# Patient Record
Sex: FEMALE | Race: WHITE | Employment: FULL TIME | URBAN - METROPOLITAN AREA
[De-identification: names, ages, dates, MRNs, and addresses within clinical notes are randomized per-mention and may not be internally consistent; named-entity substitution may affect disease eponyms.]

---

## 2017-11-28 ENCOUNTER — HOSPITAL ENCOUNTER (OUTPATIENT)
Dept: MAMMOGRAPHY | Age: 42
Discharge: HOME | End: 2017-11-28
Attending: OBSTETRICS & GYNECOLOGY
Payer: COMMERCIAL

## 2017-11-28 DIAGNOSIS — Z12.31 ENCOUNTER FOR MAMMOGRAM TO ESTABLISH BASELINE MAMMOGRAM: ICD-10-CM

## 2017-11-28 PROCEDURE — 77067 SCR MAMMO BI INCL CAD: CPT

## 2018-10-29 PROBLEM — T70.29XA: Status: ACTIVE | Noted: 2018-10-29

## 2018-10-29 PROBLEM — R06.02 SHORTNESS OF BREATH: Status: ACTIVE | Noted: 2018-10-29

## 2018-12-05 ENCOUNTER — HOSPITAL ENCOUNTER (OUTPATIENT)
Dept: MAMMOGRAPHY | Age: 43
Discharge: HOME | End: 2018-12-05
Attending: OBSTETRICS & GYNECOLOGY
Payer: COMMERCIAL

## 2018-12-05 DIAGNOSIS — Z12.31 VISIT FOR SCREENING MAMMOGRAM: ICD-10-CM

## 2018-12-05 PROCEDURE — 77067 SCR MAMMO BI INCL CAD: CPT

## 2020-04-28 PROBLEM — B34.9 VIRAL ILLNESS: Status: ACTIVE | Noted: 2020-04-28

## 2020-04-28 PROBLEM — R07.89 ANTERIOR CHEST WALL PAIN: Status: ACTIVE | Noted: 2020-04-28

## 2020-04-28 PROBLEM — R05.9 COUGH: Status: ACTIVE | Noted: 2020-04-28

## 2020-04-28 PROBLEM — J30.1 SEASONAL ALLERGIC RHINITIS DUE TO POLLEN: Status: ACTIVE | Noted: 2020-04-28

## 2020-06-22 ENCOUNTER — HOSPITAL ENCOUNTER (OUTPATIENT)
Dept: MAMMOGRAPHY | Age: 45
Discharge: HOME | End: 2020-06-22
Attending: OBSTETRICS & GYNECOLOGY
Payer: COMMERCIAL

## 2020-06-22 DIAGNOSIS — Z12.31 OTHER SCREENING MAMMOGRAM: ICD-10-CM

## 2020-06-22 PROCEDURE — 77067 SCR MAMMO BI INCL CAD: CPT

## 2021-05-24 ENCOUNTER — TRANSCRIBE ORDER (OUTPATIENT)
Dept: SCHEDULING | Age: 46
End: 2021-05-24

## 2021-05-24 DIAGNOSIS — Z12.31 VISIT FOR SCREENING MAMMOGRAM: Primary | ICD-10-CM

## 2021-06-25 ENCOUNTER — HOSPITAL ENCOUNTER (OUTPATIENT)
Dept: MAMMOGRAPHY | Age: 46
Discharge: HOME | End: 2021-06-25
Attending: OBSTETRICS & GYNECOLOGY

## 2021-06-25 DIAGNOSIS — Z12.31 VISIT FOR SCREENING MAMMOGRAM: ICD-10-CM

## 2022-01-14 PROBLEM — Z00.00 ROUTINE PHYSICAL EXAMINATION: Status: ACTIVE | Noted: 2022-01-14

## 2022-01-14 PROBLEM — E78.00 PURE HYPERCHOLESTEROLEMIA: Status: ACTIVE | Noted: 2022-01-14

## 2022-01-14 PROBLEM — T14.8XXA SKIN EXCORIATION: Status: ACTIVE | Noted: 2022-01-14

## 2022-02-08 ENCOUNTER — HOSPITAL ENCOUNTER (OUTPATIENT)
Dept: GENERAL RADIOLOGY | Age: 47
Discharge: HOME | End: 2022-02-08
Payer: COMMERCIAL

## 2022-02-08 DIAGNOSIS — R07.89 CHEST DISCOMFORT: ICD-10-CM

## 2022-02-08 DIAGNOSIS — R06.00 DYSPNEA, UNSPECIFIED TYPE: ICD-10-CM

## 2022-02-08 PROCEDURE — 71046 X-RAY EXAM CHEST 2 VIEWS: CPT

## 2022-02-08 NOTE — PROGRESS NOTES
Pt notified of normal cxr per Dr. Rosalia Johns. Pt verbalized understanding. Pt said sx's haven't improved. Offered appt per Dr. Rosalia Johns and pt would like to hold off on that for now. Advised pt if no improvement to call office back for appt. Pt agreed.

## 2022-02-13 PROBLEM — Z00.00 ROUTINE PHYSICAL EXAMINATION: Status: RESOLVED | Noted: 2022-01-14 | Resolved: 2022-02-13

## 2022-03-18 PROBLEM — B34.9 VIRAL ILLNESS: Status: ACTIVE | Noted: 2020-04-28

## 2022-03-19 PROBLEM — J30.1 SEASONAL ALLERGIC RHINITIS DUE TO POLLEN: Status: ACTIVE | Noted: 2020-04-28

## 2022-03-19 PROBLEM — T14.8XXA SKIN EXCORIATION: Status: ACTIVE | Noted: 2022-01-14

## 2022-03-19 PROBLEM — R07.89 CHEST DISCOMFORT: Status: ACTIVE | Noted: 2020-04-28

## 2022-03-19 PROBLEM — T70.29XA: Status: ACTIVE | Noted: 2018-10-29

## 2022-03-19 PROBLEM — E78.00 PURE HYPERCHOLESTEROLEMIA: Status: ACTIVE | Noted: 2022-01-14

## 2022-03-19 PROBLEM — R05.9 COUGH: Status: ACTIVE | Noted: 2020-04-28

## 2022-06-29 ENCOUNTER — HOSPITAL ENCOUNTER (OUTPATIENT)
Dept: MAMMOGRAPHY | Age: 47
Discharge: HOME | End: 2022-07-02

## 2022-06-29 DIAGNOSIS — Z12.31 ENCOUNTER FOR SCREENING MAMMOGRAM FOR MALIGNANT NEOPLASM OF BREAST: ICD-10-CM

## 2022-12-01 ENCOUNTER — OFFICE VISIT (OUTPATIENT)
Dept: OBGYN CLINIC | Age: 47
End: 2022-12-01
Payer: COMMERCIAL

## 2022-12-01 VITALS
BODY MASS INDEX: 28.91 KG/M2 | WEIGHT: 184.2 LBS | HEIGHT: 67 IN | DIASTOLIC BLOOD PRESSURE: 78 MMHG | SYSTOLIC BLOOD PRESSURE: 126 MMHG

## 2022-12-01 DIAGNOSIS — Z13.89 SCREENING FOR GENITOURINARY CONDITION: ICD-10-CM

## 2022-12-01 DIAGNOSIS — Z80.9 FAMILY HISTORY OF CANCER: ICD-10-CM

## 2022-12-01 DIAGNOSIS — Z01.419 WELL WOMAN EXAM: Primary | ICD-10-CM

## 2022-12-01 DIAGNOSIS — Z11.51 SCREENING FOR HUMAN PAPILLOMAVIRUS (HPV): ICD-10-CM

## 2022-12-01 DIAGNOSIS — Z12.4 PAP SMEAR FOR CERVICAL CANCER SCREENING: ICD-10-CM

## 2022-12-01 LAB
BILIRUBIN, URINE, POC: NEGATIVE
BLOOD URINE, POC: NEGATIVE
GLUCOSE URINE, POC: NEGATIVE
KETONES, URINE, POC: NEGATIVE
LEUKOCYTE ESTERASE, URINE, POC: NEGATIVE
NITRITE, URINE, POC: NEGATIVE
PH, URINE, POC: 5.5 (ref 4.6–8)
PROTEIN,URINE, POC: NEGATIVE
SPECIFIC GRAVITY, URINE, POC: 1.03 (ref 1–1.03)
URINALYSIS CLARITY, POC: CLEAR
URINALYSIS COLOR, POC: YELLOW
UROBILINOGEN, POC: NORMAL

## 2022-12-01 PROCEDURE — G8484 FLU IMMUNIZE NO ADMIN: HCPCS | Performed by: OBSTETRICS & GYNECOLOGY

## 2022-12-01 PROCEDURE — 99386 PREV VISIT NEW AGE 40-64: CPT | Performed by: OBSTETRICS & GYNECOLOGY

## 2022-12-01 PROCEDURE — 81002 URINALYSIS NONAUTO W/O SCOPE: CPT | Performed by: OBSTETRICS & GYNECOLOGY

## 2022-12-01 RX ORDER — MISOPROSTOL 200 UG/1
TABLET ORAL
Qty: 2 TABLET | Refills: 0 | Status: SHIPPED | OUTPATIENT
Start: 2022-12-01

## 2022-12-01 NOTE — PROGRESS NOTES
HPI: Ms. Claire Sanchez is a 49 y.o.   OB History          0    Para   0    Term   0       0    AB   0    Living   0         SAB   0    IAB   0    Ectopic   0    Molar   0    Multiple   0    Live Births   0             who is here today for a well woman exam. Periods getting heavy - now very irregular-   Changes pad every hour when heavy  PT very tearful likely afraid of bleeding with family history    Needs colonoscopy   Date Performed Result   PAP 19 Negative HR HPV Negative   Mammogram 22 BD1-Negative   Colonoscopy NA    Dexa NA      GYN History           Patient's last menstrual period was 2022 (approximate). Since April menstrual cycles have been irregular, unpredictable, and heavy. Admits to some pelvic discomfort and bloating on the right side of her abdomen. Past Medical History:  History reviewed. No pertinent past medical history. Past Surgical History:  No past surgical history on file. Allergies:   No Known Allergies    Medication History:  No current outpatient medications on file. No current facility-administered medications for this visit. Social History:  Social History     Socioeconomic History    Marital status: Single     Spouse name: Not on file    Number of children: Not on file    Years of education: Not on file    Highest education level: Not on file   Occupational History    Not on file   Tobacco Use    Smoking status: Never    Smokeless tobacco: Never   Vaping Use    Vaping Use: Never used   Substance and Sexual Activity    Alcohol use:  Yes     Alcohol/week: 7.0 standard drinks    Drug use: No    Sexual activity: Not Currently     Birth control/protection: Surgical   Other Topics Concern    Not on file   Social History Narrative    Works at [employer],  Lives by herself, came from [place] for her job     Social Determinants of Health     Financial Resource Strain: Not on [employer] Insecurity: Not on file   Transportation Needs: Not on file Physical Activity: Not on file   Stress: Not on file   Social Connections: Not on file   Intimate Partner Violence: Not on file   Housing Stability: Not on file       Family History:  Family History   Problem Relation Age of Onset    Bleeding Prob Sister     Breast Cancer Maternal Grandmother     Cancer Mother 48        stage 4 endometrial cnacer    Other Father         non hod lymph, blood clots       Review of Systems - General ROS: negative except for that discussed in HPI      ROS:  Feeling well. No dyspnea or chest pain on exertion. No abdominal pain, change in bowel habits, black or bloody stools. No urinary tract symptoms. No neurological complaints. Objective:   Ht 5' 7\" (1.702 m)   Wt 184 lb 3.2 oz (83.6 kg)   LMP 11/25/2022 (Approximate)   BMI 28.85 kg/m²     No results found for any visits on 12/01/22. The patient appears well, alert, oriented x 3, in no distress. ENT normal.  Neck supple. No adenopathy or thyromegaly. Lungs:  clear, good air entry, no wheezes, rhonchi or rales. Heart:  S1 and S2 normal, no murmurs, regular rate and rhythm. Abdomen:  soft without tenderness, guarding, mass or organomegaly. Extremities show no edema, normal peripheral pulses. Neurological is normal, no focal findings. BREAST EXAM: breasts appear normal, no suspicious masses, no skin or nipple changes or axillary nodes    PELVIC EXAM: External genitalia is within normal limits, urethra, urethra meatus and bladder are midline well supported. Vagina is well rugated, Cervix comes into full view and is within normal limits. Uterus is 6ante,  week size, no ovarian masses palpated    Assessment/Plan:      Diagnosis Orders   1. Well woman exam  AMB POC URINALYSIS DIP STICK MANUAL W/O MICRO    PAP IG, Aptima HPV and rfx 16/18,45 ([id number])    PAP IG, Aptima HPV and rfx 16/18,45 ([id number])      2. Screening for genitourinary condition  AMB POC URINALYSIS DIP STICK MANUAL W/O MICRO      3.  Pap smear for cervical cancer screening  PAP IG, Aptima HPV and rfx 16/18,45 ([id number])    PAP IG, Aptima HPV and rfx 16/18,45 ([id number])      4. Screening for human papillomavirus (HPV)  PAP IG, Aptima HPV and rfx 16/18,45 ([id number])    PAP IG, Aptima HPV and rfx 16/18,45 ([id number])      5. Family history of cancer          Encounter Diagnoses   Name Primary? Well woman exam Yes    Screening for genitourinary condition     Pap smear for cervical cancer screening     Screening for human papillomavirus (HPV)     Family history of cancer      Orders Placed This Encounter   Procedures    PAP IG, Aptima HPV and rfx 16/18,45 ([id number])     Standing Status:   Future     Number of Occurrences:   1     Standing Expiration Date:   12/1/2023     Order Specific Question:   Pap Source? (Required)     Answer:   cervical     Order Specific Question:   Pap Source? (Required)     Answer:   endocervical     Order Specific Question:   Pap Source? (Required)     Answer:   endometrial     Order Specific Question:   Pap collection method? (Required     Answer:   broom     Order Specific Question:   Pap collection method? (Required     Answer:   brush     Order Specific Question:   Menstrual Status ? Answer:   Having periods [5]     Order Specific Question:   Date of LMP? (Required)     Answer:   12/1/2022     Order Specific Question:   Previous Treatment?           (Required)     Answer:   NONE    AMB POC URINALYSIS DIP STICK MANUAL W/O MICRO       mammogram  pap smear  return annually or prn  Colonoscopy  Need us to eval lining and emb sec to bleeding and fh  cytotoec

## 2022-12-12 NOTE — PROGRESS NOTES
ENDOMETRIAL BIOPSY    Date:  1975    Name:  Ernestina Church     :  1975    Age:  49 y.o.    OB History          0    Para   0    Term   0       0    AB   0    Living   0         SAB   0    IAB   0    Ectopic   0    Molar   0    Multiple   0    Live Births   0                LMP 2022 (Approximate)     Previous Ultrasound findings showed a uterus measuring 8.08 x 3.84 x 4.25 cm, stripe 8.7 mm, right ovary is visualized with follicles and possible hypoechoic mass noted possible complex cyst vs solid mass with no flow. Left ovary is visualized with follicles and probable dominant follicle noted. Risks, benefits and alternatives of procedure described to patient in detail. Pt voices full understanding and wishes to proceed. Reason for procedure:  irregular bleeding. Mother with history of endometrial cancer (age 48) (). Maternal mother history of breast cancer. Speculum was inserted. Cervix visualized and cleansed with betadine. Single-tooth tenaculum was not applied to anterior lip. Curette was inserted through OS. Uterus sounded to 8 cm. Tissue obtained was moderate in amount. Patient tolerated procedure well. Greater than 50% of 15 minutes spent on counseling regarding procedure, follow up and future treatment.   Will see in 6 weeks for recheck of solid right ovarian cyst    Ami Hartman MD

## 2022-12-13 ENCOUNTER — OFFICE VISIT (OUTPATIENT)
Dept: OBGYN CLINIC | Age: 47
End: 2022-12-13
Payer: COMMERCIAL

## 2022-12-13 VITALS — WEIGHT: 185.2 LBS | BODY MASS INDEX: 29.07 KG/M2 | HEIGHT: 67 IN

## 2022-12-13 DIAGNOSIS — R10.2 PELVIC PAIN: ICD-10-CM

## 2022-12-13 DIAGNOSIS — R14.0 ABDOMINAL BLOATING: ICD-10-CM

## 2022-12-13 DIAGNOSIS — N92.6 IRREGULAR BLEEDING: Primary | ICD-10-CM

## 2022-12-13 DIAGNOSIS — Z01.812 PRE-PROCEDURAL LABORATORY EXAMINATION: ICD-10-CM

## 2022-12-13 PROCEDURE — 76830 TRANSVAGINAL US NON-OB: CPT | Performed by: OBSTETRICS & GYNECOLOGY

## 2022-12-13 PROCEDURE — 99213 OFFICE O/P EST LOW 20 MIN: CPT | Performed by: OBSTETRICS & GYNECOLOGY

## 2022-12-13 PROCEDURE — 58100 BIOPSY OF UTERUS LINING: CPT | Performed by: OBSTETRICS & GYNECOLOGY

## 2022-12-13 PROCEDURE — G8419 CALC BMI OUT NRM PARAM NOF/U: HCPCS | Performed by: OBSTETRICS & GYNECOLOGY

## 2022-12-13 PROCEDURE — G8428 CUR MEDS NOT DOCUMENT: HCPCS | Performed by: OBSTETRICS & GYNECOLOGY

## 2022-12-13 PROCEDURE — 1036F TOBACCO NON-USER: CPT | Performed by: OBSTETRICS & GYNECOLOGY

## 2022-12-13 PROCEDURE — G8484 FLU IMMUNIZE NO ADMIN: HCPCS | Performed by: OBSTETRICS & GYNECOLOGY

## 2022-12-15 ENCOUNTER — TELEPHONE (OUTPATIENT)
Dept: OBGYN CLINIC | Age: 47
End: 2022-12-15

## 2023-01-23 NOTE — PROGRESS NOTES
The patient is a 49 y.o. Fernandez Soria who is seen for follow up on solid right ovarian cyst. Seen 2022 for endometrial biopsy. Pathology 2022:   \"ENDOMETRIAL BIOPSY\":  BENIGN ENDOMETRIAL TISSUE. U/S findings from today 2023:    U/S findings from 2022:  Uterine volume 68.97      HISTORY:      Patient's last menstrual period was 2022 (approximate). Sexual History:  {Sexual Hx:5163}  Contraception:  {Contraception Methods:5051}  Current Outpatient Medications on File Prior to Visit   Medication Sig Dispense Refill    miSOPROStol (CYTOTEC) 200 MCG tablet Insert one tablet per vagina and take one tablet by oral route night prior to endometrial biopsy (Patient not taking: Reported on 2022) 2 tablet 0     No current facility-administered medications on file prior to visit. ROS:  Feeling well. No dyspnea or chest pain on exertion. No abdominal pain, change in bowel habits, black or bloody stools. No urinary tract symptoms. GYN ROS: {gyn ros:153915}. PHYSICAL EXAM:  Last menstrual period 2022, not currently breastfeeding. The patient appears well, alert, oriented x 3, in no distress. Lungs are clear. Heart RRR, no murmurs. Abdomen soft without tenderness, guarding, mass or organomegaly. Pelvic: {pelvic exam:851893::\"normal external genitalia, vulva, vagina, cervix, uterus and adnexa\"}. ASSESSMENT:    {There are no diagnoses linked to this encounter.  (Refresh or delete this SmartLink)}     PLAN:  {Gyn plan:77242}          Jes Stein RN

## 2023-01-25 ENCOUNTER — OFFICE VISIT (OUTPATIENT)
Dept: OBGYN CLINIC | Age: 48
End: 2023-01-25
Payer: COMMERCIAL

## 2023-01-25 VITALS
DIASTOLIC BLOOD PRESSURE: 74 MMHG | WEIGHT: 183.6 LBS | SYSTOLIC BLOOD PRESSURE: 124 MMHG | BODY MASS INDEX: 28.82 KG/M2 | HEIGHT: 67 IN

## 2023-01-25 DIAGNOSIS — N83.201 RIGHT OVARIAN CYST: Primary | ICD-10-CM

## 2023-01-25 DIAGNOSIS — Z80.9 FAMILY HISTORY OF CANCER: ICD-10-CM

## 2023-01-25 DIAGNOSIS — N92.6 IRREGULAR BLEEDING: ICD-10-CM

## 2023-01-25 DIAGNOSIS — R14.0 ABDOMINAL BLOATING: ICD-10-CM

## 2023-01-25 DIAGNOSIS — Z13.89 SCREENING FOR GENITOURINARY CONDITION: ICD-10-CM

## 2023-01-25 PROCEDURE — 99213 OFFICE O/P EST LOW 20 MIN: CPT | Performed by: OBSTETRICS & GYNECOLOGY

## 2023-01-25 PROCEDURE — 76830 TRANSVAGINAL US NON-OB: CPT | Performed by: OBSTETRICS & GYNECOLOGY

## 2023-01-25 PROCEDURE — G8484 FLU IMMUNIZE NO ADMIN: HCPCS | Performed by: OBSTETRICS & GYNECOLOGY

## 2023-01-25 PROCEDURE — G8419 CALC BMI OUT NRM PARAM NOF/U: HCPCS | Performed by: OBSTETRICS & GYNECOLOGY

## 2023-01-25 PROCEDURE — G8427 DOCREV CUR MEDS BY ELIG CLIN: HCPCS | Performed by: OBSTETRICS & GYNECOLOGY

## 2023-01-25 PROCEDURE — 1036F TOBACCO NON-USER: CPT | Performed by: OBSTETRICS & GYNECOLOGY

## 2023-01-25 NOTE — PROGRESS NOTES
The patient is a 49 y.o. Alisha Andrews who is seen for follow up on solid right ovarian cyst. Seen 2022 for endometrial biopsy. No bleeding since biopsy  Biopsy results benign  U/S findings from today 2023:  GYN US performed to follow up a ? Rt ov cyst  CX appears closed with numerous simple nabothian cyst all <1 cm  CX appears Retroverted with anteflexed uterus  Sliver of fluid noted in cervical canal  Heterogenous uterus   Endo=2.8 mm no intracavitary masses visualized   There is a new cystic area just posterior and left of endometrium = 7 x 7 x 4mm  Previously noted tiny fibroid not appreciated in today's exam  LOV visualized with follicles and nonvascular echogenic round area = 3 x 2 x 2 mm  Bilateral adnexal appear WNL  Rt ovary difficult to visualized due to heavy presence of bowel fas  There does not appear to be a solid or cystic mass on the rt ovary in today's exam  (RT ovary scanned tv & ta)  Volume: 62.216    Pathology 2022:   \"ENDOMETRIAL BIOPSY\":  BENIGN ENDOMETRIAL TISSUE. U/S findings from 2022:  Uterine volume 68.97      HISTORY:      Patient's last menstrual period was 2023. Sexual History:  has sex with males  Contraception:  none  No current outpatient medications on file prior to visit. No current facility-administered medications on file prior to visit. ROS:  Feeling well. No dyspnea or chest pain on exertion. No abdominal pain, change in bowel habits, black or bloody stools. No urinary tract symptoms. GYN ROS: no breast pain or new or enlarging lumps on self exam.    PHYSICAL EXAM:  Blood pressure 124/74, height 5' 7\" (1.702 m), weight 183 lb 9.6 oz (83.3 kg), last menstrual period 2023, not currently breastfeeding. The patient appears well, alert, oriented x 3, in no distress. SEE US    ASSESSMENT:    1. Right ovarian cyst  -     AMB POC US, TRANSVAGINAL  2. Irregular bleeding  -     AMB POC US, TRANSVAGINAL  3.  Abdominal bloating  - AMB POC US, TRANSVAGINAL  4.  Family history of cancer  -     AMB POC US, TRANSVAGINAL  5. Screening for genitourinary condition  -     AMB POC US, TRANSVAGINAL     PLAN:  Unable to completely visualize cyst today- ROV difficult to see  RTO for f/u with Gas X and low residual diet prior to us          Larissa Hong MD

## 2023-03-07 ENCOUNTER — OFFICE VISIT (OUTPATIENT)
Dept: OBGYN CLINIC | Age: 48
End: 2023-03-07
Payer: COMMERCIAL

## 2023-03-07 VITALS
WEIGHT: 185.8 LBS | SYSTOLIC BLOOD PRESSURE: 122 MMHG | DIASTOLIC BLOOD PRESSURE: 74 MMHG | HEIGHT: 67 IN | BODY MASS INDEX: 29.16 KG/M2

## 2023-03-07 DIAGNOSIS — N83.201 RIGHT OVARIAN CYST: Primary | ICD-10-CM

## 2023-03-07 PROCEDURE — G8419 CALC BMI OUT NRM PARAM NOF/U: HCPCS | Performed by: OBSTETRICS & GYNECOLOGY

## 2023-03-07 PROCEDURE — 99213 OFFICE O/P EST LOW 20 MIN: CPT | Performed by: OBSTETRICS & GYNECOLOGY

## 2023-03-07 PROCEDURE — 76830 TRANSVAGINAL US NON-OB: CPT | Performed by: OBSTETRICS & GYNECOLOGY

## 2023-03-07 PROCEDURE — 1036F TOBACCO NON-USER: CPT | Performed by: OBSTETRICS & GYNECOLOGY

## 2023-03-07 PROCEDURE — G8427 DOCREV CUR MEDS BY ELIG CLIN: HCPCS | Performed by: OBSTETRICS & GYNECOLOGY

## 2023-03-07 PROCEDURE — G8484 FLU IMMUNIZE NO ADMIN: HCPCS | Performed by: OBSTETRICS & GYNECOLOGY

## 2023-03-07 NOTE — PROGRESS NOTES
The patient is a 49 y.o. Alyssa Prudent who is seen for U/S and follow up secondary to ROV cyst. Pt had EMB 2022 Benign. Pt denies pain or bleeding. U/S findings from today 3/7/2023:  Gyn U/S performed secondary to ROV F/U  Cx dilated with simple fluid today measuring 1.7 x 1.6 x 1.7 cm  Uterus is anteverted and very heterogenous  Endo=13.2 mm, heterogenous, simple cystic intracavitary mass visualized LT endo measuring 0.6 x 0.3 x 0.7 cm  ROV is visualized with follicles and probable CL  LOV is visualized with follicles and simple cyst vs dominant follicle  Bilateral adnexa appear wnl  Uterine volume 98.11  Also reviewed us from  and compared all 3 with interpretation    U/S findings from today 2023:  GYN US performed to follow up a ? Rt ov cyst  CX appears closed with numerous simple nabothian cyst all <1 cm  CX appears Retroverted with anteflexed uterus  Sliver of fluid noted in cervical canal  Heterogenous uterus   Endo=2.8 mm no intracavitary masses visualized   There is a new cystic area just posterior and left of endometrium = 7 x 7 x 4mm  Previously noted tiny fibroid not appreciated in today's exam  LOV visualized with follicles and nonvascular echogenic round area = 3 x 2 x 2 mm  Bilateral adnexal appear WNL  Rt ovary difficult to visualized due to heavy presence of bowel fas  There does not appear to be a solid or cystic mass on the rt ovary in today's exam  (RT ovary scanned tv & ta)  Volume: 62.216    HISTORY:      Patient's last menstrual period was 2023 (approximate). Sexual History:  has sex with males  Contraception:  none  No current outpatient medications on file prior to visit. No current facility-administered medications on file prior to visit. ROS:  Feeling well. No dyspnea or chest pain on exertion. No abdominal pain, change in bowel habits, black or bloody stools. No urinary tract symptoms.  GYN ROS: no breast pain or new or enlarging lumps on self exam.    PHYSICAL EXAM:  Blood pressure 122/74, height 5' 7\" (1.702 m), weight 185 lb 12.8 oz (84.3 kg), last menstrual period 01/02/2023, not currently breastfeeding. The patient appears well, alert, oriented x 3, in no distress. SEE us   Interpretation of us today and previous 2  EMB reviewed again b9  US cc with B9 findings   No bleeding since January- no intervention needed as solid area on ROV resolved    ASSESSMENT:    1. Right ovarian cyst     PLAN:  Reassurance call with nay further concern  All ? S answered          Ju Beckford RN

## 2023-06-02 ENCOUNTER — TRANSCRIBE ORDERS (OUTPATIENT)
Dept: SCHEDULING | Age: 48
End: 2023-06-02

## 2023-06-02 DIAGNOSIS — Z12.31 SCREENING MAMMOGRAM FOR HIGH-RISK PATIENT: Primary | ICD-10-CM

## 2023-07-05 ENCOUNTER — HOSPITAL ENCOUNTER (OUTPATIENT)
Dept: MAMMOGRAPHY | Age: 48
Discharge: HOME | End: 2023-07-08
Attending: OBSTETRICS & GYNECOLOGY

## 2023-07-05 DIAGNOSIS — Z12.31 SCREENING MAMMOGRAM FOR HIGH-RISK PATIENT: ICD-10-CM

## 2024-06-28 ENCOUNTER — TRANSCRIBE ORDERS (OUTPATIENT)
Dept: SCHEDULING | Age: 49
End: 2024-06-28

## 2024-06-28 DIAGNOSIS — Z12.31 SCREENING MAMMOGRAM FOR HIGH-RISK PATIENT: Primary | ICD-10-CM

## 2025-07-22 ENCOUNTER — HOSPITAL ENCOUNTER (OUTPATIENT)
Dept: MAMMOGRAPHY | Age: 50
Discharge: HOME | End: 2025-07-25
Payer: COMMERCIAL

## 2025-07-22 VITALS — HEIGHT: 67 IN | BODY MASS INDEX: 29.82 KG/M2 | WEIGHT: 190 LBS

## 2025-07-22 DIAGNOSIS — Z12.31 SCREENING MAMMOGRAM FOR BREAST CANCER: ICD-10-CM

## 2025-07-22 PROCEDURE — 77063 BREAST TOMOSYNTHESIS BI: CPT
